# Patient Record
Sex: MALE | Race: WHITE | URBAN - METROPOLITAN AREA
[De-identification: names, ages, dates, MRNs, and addresses within clinical notes are randomized per-mention and may not be internally consistent; named-entity substitution may affect disease eponyms.]

---

## 2024-01-12 ENCOUNTER — OFFICE VISIT (OUTPATIENT)
Age: 39
End: 2024-01-12

## 2024-01-12 VITALS
SYSTOLIC BLOOD PRESSURE: 129 MMHG | WEIGHT: 315 LBS | HEART RATE: 103 BPM | OXYGEN SATURATION: 94 % | DIASTOLIC BLOOD PRESSURE: 78 MMHG | TEMPERATURE: 99.8 F

## 2024-01-12 DIAGNOSIS — J40 BRONCHITIS: Primary | ICD-10-CM

## 2024-01-12 RX ORDER — BENZONATATE 200 MG/1
200 CAPSULE ORAL 3 TIMES DAILY PRN
Qty: 30 CAPSULE | Refills: 0 | Status: SHIPPED | OUTPATIENT
Start: 2024-01-12

## 2024-01-12 RX ORDER — DOXYCYCLINE HYCLATE 100 MG
100 TABLET ORAL 2 TIMES DAILY
Qty: 20 TABLET | Refills: 0 | Status: SHIPPED | OUTPATIENT
Start: 2024-01-12 | End: 2024-01-22

## 2024-01-12 RX ORDER — ALBUTEROL SULFATE 90 UG/1
2 AEROSOL, METERED RESPIRATORY (INHALATION) 4 TIMES DAILY PRN
Qty: 18 G | Refills: 0 | Status: SHIPPED | OUTPATIENT
Start: 2024-01-12

## 2024-01-12 RX ORDER — METHYLPREDNISOLONE 4 MG/1
TABLET ORAL
Qty: 1 KIT | Refills: 0 | Status: SHIPPED | OUTPATIENT
Start: 2024-01-12 | End: 2024-01-18

## 2024-01-12 ASSESSMENT — ENCOUNTER SYMPTOMS
WHEEZING: 0
COUGH: 1
SORE THROAT: 0
SHORTNESS OF BREATH: 0
SINUS PAIN: 0

## 2024-01-12 NOTE — PROGRESS NOTES
Weight: (!) 162.7 kg (358 lb 9.6 oz)       Review of Systems   Constitutional:  Negative for fever.   HENT:  Positive for congestion. Negative for sinus pain and sore throat.    Respiratory:  Positive for cough. Negative for shortness of breath and wheezing.    Cardiovascular:  Negative for chest pain.   Musculoskeletal:  Negative for myalgias.   Neurological:  Negative for headaches.       Physical Exam    Physical  obese  Vitals signs: reviewed  Constitutional:  appearance: well nourished ..  does not appear acutely ill     Eyes:                 Pupil: equal-round-reactive to light, no photophobia, EOMI            Cornea: clear            Sclera: clear, non injected, non icteric    Ears: Right canal clear / TM normal           Left ear canal clear / TM normal  Nose/Sinus:  no nasal congestion/no drainage   Mouth:                 Mucous membranes moist               Oropharynx:  clear,   Neck:    supple, non tender,               No cervical lymph nodes   Pulmonary/Lungs:  effort normal, no stridor                                 Auscultation: good air movement / breath sounds : end expiratory mild wheeze.  . no  rhonchi/ no rales  Cardio-vascular:   heart rate 103... and rhythm regular                              Heart sounds normal-no murmur-rub   Abdomen:    soft .. Non tender,    Muscular skeleton:  motor strength normal / muscle tone normal                                  Extremities/joints: no tenderness, normal movements                                  Lower extremities: no calf tenderness  Neurological:  no focal deficit  Skin: no rash   Psychiatric:   behavior appropriate--no confusion                              .  An electronic signature was used to authenticate this note.    --Anders Dejesus MD

## 2024-01-12 NOTE — PATIENT INSTRUCTIONS
Keep hydrated, tylenol   (if no contraindications) as needed if pain or fever.. Take medications as prescribed.. follow up in 7- days if not better  Return sooner or go see PCP if symptoms worse/feeling worse or has new symptoms or concerns  Go to ER  if fever/chills, increase shortness of breath, increase congestion or wheezing despite medications, if associated with chest pain, nausea/vomiting, dizzy/ light-headed sensation.